# Patient Record
Sex: MALE | Race: BLACK OR AFRICAN AMERICAN | NOT HISPANIC OR LATINO | Employment: PART TIME | URBAN - METROPOLITAN AREA
[De-identification: names, ages, dates, MRNs, and addresses within clinical notes are randomized per-mention and may not be internally consistent; named-entity substitution may affect disease eponyms.]

---

## 2022-05-22 ENCOUNTER — TELEPHONE (OUTPATIENT)
Dept: BEHAVIORAL HEALTH | Facility: CLINIC | Age: 37
End: 2022-05-22

## 2022-05-22 ENCOUNTER — APPOINTMENT (OUTPATIENT)
Dept: RADIOLOGY | Facility: HOSPITAL | Age: 37
End: 2022-05-22
Attending: EMERGENCY MEDICINE

## 2022-05-22 ENCOUNTER — HOSPITAL ENCOUNTER (EMERGENCY)
Facility: HOSPITAL | Age: 37
Discharge: HOME | End: 2022-05-23
Attending: EMERGENCY MEDICINE | Admitting: EMERGENCY MEDICINE

## 2022-05-22 DIAGNOSIS — F15.10 AMPHETAMINE ABUSE (H): Primary | ICD-10-CM

## 2022-05-22 DIAGNOSIS — R44.0 AUDITORY HALLUCINATION: ICD-10-CM

## 2022-05-22 LAB
AMPHETAMINES UR QL SCN: ABNORMAL
ANION GAP SERPL CALCULATED.3IONS-SCNC: 12 MMOL/L (ref 5–18)
ATRIAL RATE - MUSE: 114 BPM
BARBITURATES UR QL: ABNORMAL
BASOPHILS # BLD AUTO: 0.1 10E3/UL (ref 0–0.2)
BASOPHILS NFR BLD AUTO: 1 %
BENZODIAZ UR QL: ABNORMAL
BUN SERPL-MCNC: 10 MG/DL (ref 8–22)
CALCIUM SERPL-MCNC: 9.2 MG/DL (ref 8.5–10.5)
CANNABINOIDS UR QL SCN: ABNORMAL
CHLORIDE BLD-SCNC: 105 MMOL/L (ref 98–107)
CO2 SERPL-SCNC: 24 MMOL/L (ref 22–31)
COCAINE UR QL: ABNORMAL
CREAT SERPL-MCNC: 1.29 MG/DL (ref 0.7–1.3)
CREAT UR-MCNC: >500 MG/DL
D DIMER PPP FEU-MCNC: 0.41 UG/ML FEU (ref 0–0.5)
DIASTOLIC BLOOD PRESSURE - MUSE: NORMAL MMHG
EOSINOPHIL # BLD AUTO: 0.1 10E3/UL (ref 0–0.7)
EOSINOPHIL NFR BLD AUTO: 1 %
ERYTHROCYTE [DISTWIDTH] IN BLOOD BY AUTOMATED COUNT: 15.5 % (ref 10–15)
FLUAV RNA SPEC QL NAA+PROBE: NEGATIVE
FLUBV RNA RESP QL NAA+PROBE: NEGATIVE
GFR SERPL CREATININE-BSD FRML MDRD: 73 ML/MIN/1.73M2
GLUCOSE BLD-MCNC: 84 MG/DL (ref 70–125)
HCT VFR BLD AUTO: 44.2 % (ref 40–53)
HGB BLD-MCNC: 13.8 G/DL (ref 13.3–17.7)
HOLD SPECIMEN: NORMAL
IMM GRANULOCYTES # BLD: 0 10E3/UL
IMM GRANULOCYTES NFR BLD: 0 %
INTERPRETATION ECG - MUSE: NORMAL
LYMPHOCYTES # BLD AUTO: 2.7 10E3/UL (ref 0.8–5.3)
LYMPHOCYTES NFR BLD AUTO: 27 %
MCH RBC QN AUTO: 26.7 PG (ref 26.5–33)
MCHC RBC AUTO-ENTMCNC: 31.2 G/DL (ref 31.5–36.5)
MCV RBC AUTO: 86 FL (ref 78–100)
METHADONE UR QL SCN: ABNORMAL
MONOCYTES # BLD AUTO: 0.8 10E3/UL (ref 0–1.3)
MONOCYTES NFR BLD AUTO: 8 %
NEUTROPHILS # BLD AUTO: 6.4 10E3/UL (ref 1.6–8.3)
NEUTROPHILS NFR BLD AUTO: 63 %
NRBC # BLD AUTO: 0 10E3/UL
NRBC BLD AUTO-RTO: 0 /100
OPIATES UR QL SCN: ABNORMAL
OXYCODONE UR QL: ABNORMAL
P AXIS - MUSE: 71 DEGREES
PCP UR QL SCN: ABNORMAL
PLATELET # BLD AUTO: 378 10E3/UL (ref 150–450)
POTASSIUM BLD-SCNC: 3.6 MMOL/L (ref 3.5–5)
PR INTERVAL - MUSE: 150 MS
QRS DURATION - MUSE: 84 MS
QT - MUSE: 290 MS
QTC - MUSE: 399 MS
R AXIS - MUSE: 40 DEGREES
RBC # BLD AUTO: 5.16 10E6/UL (ref 4.4–5.9)
SARS-COV-2 RNA RESP QL NAA+PROBE: NEGATIVE
SODIUM SERPL-SCNC: 141 MMOL/L (ref 136–145)
SYSTOLIC BLOOD PRESSURE - MUSE: NORMAL MMHG
T AXIS - MUSE: 15 DEGREES
TROPONIN I SERPL-MCNC: <0.01 NG/ML (ref 0–0.29)
VENTRICULAR RATE- MUSE: 114 BPM
WBC # BLD AUTO: 10 10E3/UL (ref 4–11)

## 2022-05-22 PROCEDURE — 250N000011 HC RX IP 250 OP 636: Performed by: EMERGENCY MEDICINE

## 2022-05-22 PROCEDURE — C9803 HOPD COVID-19 SPEC COLLECT: HCPCS

## 2022-05-22 PROCEDURE — 85004 AUTOMATED DIFF WBC COUNT: CPT | Performed by: EMERGENCY MEDICINE

## 2022-05-22 PROCEDURE — 80048 BASIC METABOLIC PNL TOTAL CA: CPT | Performed by: EMERGENCY MEDICINE

## 2022-05-22 PROCEDURE — 99285 EMERGENCY DEPT VISIT HI MDM: CPT | Mod: 25

## 2022-05-22 PROCEDURE — 71046 X-RAY EXAM CHEST 2 VIEWS: CPT

## 2022-05-22 PROCEDURE — 36415 COLL VENOUS BLD VENIPUNCTURE: CPT | Performed by: EMERGENCY MEDICINE

## 2022-05-22 PROCEDURE — 87636 SARSCOV2 & INF A&B AMP PRB: CPT | Performed by: EMERGENCY MEDICINE

## 2022-05-22 PROCEDURE — 80307 DRUG TEST PRSMV CHEM ANLYZR: CPT | Performed by: EMERGENCY MEDICINE

## 2022-05-22 PROCEDURE — 250N000013 HC RX MED GY IP 250 OP 250 PS 637: Performed by: EMERGENCY MEDICINE

## 2022-05-22 PROCEDURE — 90791 PSYCH DIAGNOSTIC EVALUATION: CPT

## 2022-05-22 PROCEDURE — 85379 FIBRIN DEGRADATION QUANT: CPT | Performed by: EMERGENCY MEDICINE

## 2022-05-22 PROCEDURE — 96374 THER/PROPH/DIAG INJ IV PUSH: CPT

## 2022-05-22 PROCEDURE — 93005 ELECTROCARDIOGRAM TRACING: CPT | Performed by: STUDENT IN AN ORGANIZED HEALTH CARE EDUCATION/TRAINING PROGRAM

## 2022-05-22 PROCEDURE — 93005 ELECTROCARDIOGRAM TRACING: CPT | Performed by: EMERGENCY MEDICINE

## 2022-05-22 PROCEDURE — 84484 ASSAY OF TROPONIN QUANT: CPT | Performed by: EMERGENCY MEDICINE

## 2022-05-22 RX ORDER — KETOROLAC TROMETHAMINE 30 MG/ML
15 INJECTION, SOLUTION INTRAMUSCULAR; INTRAVENOUS ONCE
Status: COMPLETED | OUTPATIENT
Start: 2022-05-22 | End: 2022-05-22

## 2022-05-22 RX ORDER — ACETAMINOPHEN 325 MG/1
975 TABLET ORAL ONCE
Status: COMPLETED | OUTPATIENT
Start: 2022-05-22 | End: 2022-05-22

## 2022-05-22 RX ADMIN — ACETAMINOPHEN 975 MG: 325 TABLET ORAL at 10:35

## 2022-05-22 RX ADMIN — KETOROLAC TROMETHAMINE 15 MG: 30 INJECTION, SOLUTION INTRAMUSCULAR at 07:56

## 2022-05-22 ASSESSMENT — ENCOUNTER SYMPTOMS
VOMITING: 0
FEVER: 0
ABDOMINAL PAIN: 0
DYSURIA: 0
FATIGUE: 0
WEAKNESS: 0
COUGH: 1
CHILLS: 0
HEADACHES: 0
SHORTNESS OF BREATH: 0

## 2022-05-22 NOTE — ED PROVIDER NOTES
EMERGENCY DEPARTMENT ENCOUNTER      NAME: Naveen Baez  AGE: 27 year old male  YOB: 1995  MRN: [id number]  EVALUATION DATE & TIME: 5/22/2022  6:15 AM    PCP: No primary care provider on file.    ED PROVIDER: Uma Fischer DO      Chief Complaint   Patient presents with     Chest Pain         FINAL IMPRESSION:  1. Suicidal ideation    2. Auditory hallucination          ED COURSE & MEDICAL DECISION MAKING:    Pertinent Labs & Imaging studies reviewed. (See chart for details)  6:23 AM I met the patient and performed my initial interview and exam.  9:02 AM I rechecked and updated the patient. Patient states that he uses synthetic marijuana, and gets chest pain when he uses that. Requests to talk to mental health provider, denies suicidal ideation, says he's having mental health issues.   9:31 AM I rechecked and updated the patient. Patient reports hearing voices telling him to hurt himself by overdose, and he doesn't feel safe.   12:34 PM I spoke with DEC . Patient continues to endorse thoughts of hurting himself. Patient endorses auditory and visual hallucinations, uses multiple substances to help with depression. Patient could not agree to safety plan for discharge.   2:00 PM  Signed out to Dr. Wilson pending bed placement.      27 year old male presents to the Emergency Department for evaluation of chest pain.  Reports this started a couple days ago after moving some furniture.  He denies any trauma.  He denies any pain with movement of the fracture.  Located to his anterior chest.  Endorses a cough.  No known sick contacts.  Patient is tachycardic and hypertensive on arrival.  Temperature is 37.7  C.  He has reproducible chest pain to the anterior wall.  No overlying rashes.  EKG shows sinus tachycardia with nonspecific changes.  Patient reports history of asthma however otherwise healthy and is not on any daily medications.  Plan to evaluate for ACS, pulmonary embolism, pneumothorax, pneumonia,  coronavirus, influenza among others.  History not very consistent with pericarditis.  No muffled heart sounds.  Lung sounds normal.  He reports a history of tobacco, alcohol and marijuana use but denies any other ilicit drugs.  He is calm on exam, slightly anxious.  Bedside echo with good squeeze, no pericardial effusion.    Labs and imaging are unremarkable.  He remains mildly tachycardic.  He does tell me that he has been using synthetic marijuana.  He endorses hearing and seeing voices, voices telling him to harm himself.  We will obtain a mental health assessment.  Patient endorses these thoughts to the mental health .  Endorses polysubstance use.  Cannot contract for safety.  Patient to be admitted voluntarily.  Pending placement at time of handoff.      PPE worn: surgical mask, gloves      At the conclusion of the encounter I discussed the results of all of the tests and the disposition. The questions were answered. The patient or family acknowledged understanding and was agreeable with the care plan.       MEDICATIONS GIVEN IN THE EMERGENCY:  Medications   ketorolac (TORADOL) injection 15 mg (15 mg Intravenous Given 5/22/22 0756)   acetaminophen (TYLENOL) tablet 975 mg (975 mg Oral Given 5/22/22 1035)       NEW PRESCRIPTIONS STARTED AT TODAY'S ER VISIT  New Prescriptions    No medications on file          =================================================================    HPI    Patient information was obtained from: Patient    Use of : N/A         Naveen Baez is a 27 year old male with a pertinent history of asthma who presents to this ED via private car for evaluation of chest pain.     Patient started having chest pain after moving a few boxes. Patient also endorses cough.  Patient also took 4 baby aspirin this morning at 5 am to no effect. Patient says left side of chest is sharp pain, right side is pressure pain. Patient notes he has history of asthma related chest pain, but this feels  "different. Patient denies any fever, sick contacts. Patient drinks alcohol, and smokes weed. Patient does not take any medications.       REVIEW OF SYSTEMS   Review of Systems   Constitutional: Negative for chills, fatigue and fever.   Respiratory: Positive for cough. Negative for shortness of breath.    Cardiovascular: Positive for chest pain.   Gastrointestinal: Negative for abdominal pain and vomiting.   Genitourinary: Negative for dysuria.   Skin: Negative.    Neurological: Negative for syncope, weakness and headaches.   Psychiatric/Behavioral: Positive for suicidal ideas.   All other systems reviewed and are negative.       PAST MEDICAL HISTORY:  No past medical history on file.    PAST SURGICAL HISTORY:  No past surgical history on file.        CURRENT MEDICATIONS:    No current outpatient medications on file.       ALLERGIES:  No Known Allergies    FAMILY HISTORY:  No family history on file.    SOCIAL HISTORY:        VITALS:  BP (!) 143/83   Pulse 102   Temp 98.1  F (36.7  C) (Oral)   Resp 19   Ht 1.854 m (6' 1\")   Wt 124.7 kg (275 lb)   SpO2 97%   BMI 36.28 kg/m      PHYSICAL EXAM    Physical Exam  Constitutional:       General: He is not in acute distress.  HENT:      Head: Normocephalic and atraumatic.      Mouth/Throat:      Pharynx: Oropharynx is clear.   Eyes:      Pupils: Pupils are equal, round, and reactive to light.   Cardiovascular:      Rate and Rhythm: Regular rhythm. Tachycardia present.      Pulses: Normal pulses.      Heart sounds: Normal heart sounds.   Pulmonary:      Effort: Pulmonary effort is normal.      Breath sounds: Normal breath sounds.   Chest:      Chest wall: Tenderness present. No deformity or crepitus.   Abdominal:      General: Abdomen is flat. Bowel sounds are normal.      Palpations: Abdomen is soft.      Tenderness: There is no abdominal tenderness.   Musculoskeletal:         General: Normal range of motion.   Skin:     General: Skin is warm and dry.      Capillary " Refill: Capillary refill takes less than 2 seconds.   Neurological:      General: No focal deficit present.      Mental Status: He is alert and oriented to person, place, and time.   Psychiatric:         Mood and Affect: Mood is anxious.         Thought Content: Thought content includes suicidal ideation.             LAB:  All pertinent labs reviewed and interpreted.  Labs Ordered and Resulted from Time of ED Arrival to Time of ED Departure   CBC WITH PLATELETS AND DIFFERENTIAL - Abnormal       Result Value    WBC Count 10.0      RBC Count 5.16      Hemoglobin 13.8      Hematocrit 44.2      MCV 86      MCH 26.7      MCHC 31.2 (*)     RDW 15.5 (*)     Platelet Count 378      % Neutrophils 63      % Lymphocytes 27      % Monocytes 8      % Eosinophils 1      % Basophils 1      % Immature Granulocytes 0      NRBCs per 100 WBC 0      Absolute Neutrophils 6.4      Absolute Lymphocytes 2.7      Absolute Monocytes 0.8      Absolute Eosinophils 0.1      Absolute Basophils 0.1      Absolute Immature Granulocytes 0.0      Absolute NRBCs 0.0     DRUGS OF ABUSE 1+ PANEL, URINE ([facility] ONLY) - Abnormal    Amphetamines Urine Screen Positive (*)     Benzodiazepines Urine Screen Positive (*)     Opiates Urine Screen Negative      PCP Urine Screen Negative      Cannabinoids Urine Screen Negative      Barbiturates Urine Screen Negative      Cocaine Urine Screen Negative      Methadone Urine Screen Negative      Oxycodone Urine Screen Negative      Creatinine Urine mg/dL >500     D DIMER QUANTITATIVE - Normal    D-Dimer Quantitative 0.41     BASIC METABOLIC PANEL - Normal    Sodium 141      Potassium 3.6      Chloride 105      Carbon Dioxide (CO2) 24      Anion Gap 12      Urea Nitrogen 10      Creatinine 1.29      Calcium 9.2      Glucose 84      GFR Estimate 73     TROPONIN I - Normal    Troponin I <0.01     INFLUENZA A/B & SARS-COV2 PCR MULTIPLEX - Normal    Influenza A PCR Negative      Influenza B PCR Negative      SARS CoV2 PCR  Negative         RADIOLOGY:  Reviewed all pertinent imaging. Please see official radiology report.  XR Chest 2 Views   Final Result   IMPRESSION: Negative chest.          EKG:    Performed at: 6:09 AM    Impression: sinus tachycardia, nonspecific T wave abnormality    Rate: 114 bpm  Rhythm: sinus tachycardia  Axis: 71 40 15  WI Interval: 150 ms  QRS Interval: 84 ms  QTc Interval: 399 ms  ST Changes: nonspecific T wave abnormality  Comparison: none    I have independently reviewed and interpreted the EKG(s) documented above.        PROCEDURE:    Emergency Department Limited Bedside Screening Cardiac Ultrasound   INDICATIONS: chest pain   PROCEDURE PROVIDER: Dr. Ama Fischer    WINDOW AND FINDINGS:    SUB-XYPHOID     :      Cardiac activity: Normal  Pericardial effusion: No clinically significant pericardial effusion  Signs of Tamponade physiology: Absent   PARASTERNAL    :  Cardiac activity: Normal  Pericardial effusion: No clinically significant pericardial effusion  Signs of Tamponade physiology: Absent     IMAGES SAVED AND STORED FOR ARCHIVE AND REVIEW: No         I, Nabor Long, am serving as a scribe to document services personally performed by Dr. Uma Fischer based on my observation and the provider's statements to me. Uma MATTHEWS, DO attest that Nabor Long is acting in a scribe capacity, has observed my performance of the services and has documented them in accordance with my direction.    Uma Fischer DO  Emergency Medicine  [facility] EMERGENCY DEPARTMENT  [address] [id number] [phone]  Dept: [phone]     Uma Fischer DO  05/22/22 6164

## 2022-05-22 NOTE — TELEPHONE ENCOUNTER
S:  Jenny, DEC called @ 12:45pm with 37y/M in [facility] ED with SI for IP MH.    B:  Pt presents to ED reporting SI with a plan to overdose.   Pt has a significant drug hx and depression hx.  Pt has never been IP for MH before, and takes no psych medications.   Pt is reports AH of a voice telling him to hurt self.  Additionally VH of shadows and hollograms anastacia people who have passed.   Pt has been very calm and cooperative in ED and wants IP to get help.  No medical Concerns and Ind w/all ADL's.       A:  Vol    R:  Patient cleared and ready for behavioral bed placement: Yes

## 2022-05-22 NOTE — ED NOTES
SaraNaveen Baez  May 22, 2022  SAFE Note    Critical Safety Issues: reported command hallucinations instructing him to harm himself.       Current Suicidal Ideation/Self-Injurious Concerns/Methods: Ingestion OD on drugs      Current or Historical Inappropriate Sexual Behavior: No      Current or Historical Aggression/Homicidal Ideation: None - N/A      Triggers: None reported    Guardianship Status: [facility] Guardian: is his own guardian.. Guardianship paperwork is not required.    This patient is a child/adolescent: No    This patient has additional special visitor precautions: No    Updated care team: Yes:     For additional details see full [facility] assessment.       Nohelia Marques, LP
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
## 2022-05-22 NOTE — ED TRIAGE NOTES
He reports CP for 3 days. He states he was moving heavy furniture. ECG completed in lobby. He states he too 4 baby aspirin around 5am. Pain an 8.     Triage Assessment     Row Name 05/22/22 0613       Triage Assessment (Adult)    Airway WDL WDL       Respiratory WDL    Respiratory WDL WDL       Skin Circulation/Temperature WDL    Skin Circulation/Temperature WDL WDL       Cardiac WDL    Cardiac WDL X  cp       Peripheral/Neurovascular WDL    Peripheral Neurovascular WDL WDL       Cognitive/Neuro/Behavioral WDL    Cognitive/Neuro/Behavioral WDL WDL

## 2022-05-22 NOTE — TELEPHONE ENCOUNTER
5/22/2022 Adult Bed Availability    Updated Bed Search @5pm:    [facility] @ cap  [facility] @ cap  [facility] @ cap  [facility] @ cap  [facility] @ cap  [facility] @ cap  [facility] @ cap  [facility] @ cap  [facility] @ cap  [facility] @ cap  [facility] @ cap  [facility] @ cap  [facility] @ cap  [facility] @ cap  [facility] @ cap  [facility] @ cap  [facility] @ cap  [facility] @ cap  [facility] posting one avail beds. Voluntary pts only. No aggression/violence current or hx of. [phone].  [facility] posting one avail beds. [phone]. Per call to Janna, one low acuity bed avail.  [facility] posting six avail beds, low acuity only. [phone].  [facility] @ cap  [facility] @ cap  [facility] posting one avail bed. Voluntary pts only, no aggression/violence    [facility] posting four avail beds. [phone].  [facility] @ cap        5:20pm Pt remains on work list until appropriate placement is available

## 2022-05-22 NOTE — ED NOTES
[facility] ED Mental Health Handoff Note:     Assuming care from: Dr Ama Shin    Brief HPI: 37 year old male signed out to me by the above provider. See initial ED Provider note for full details of the presentation.   In brief, patient initially presented with chest pain but then became suicidal.  He likely has some illicit drugs on board    Home meds reviewed and ordered/administered: Yes  Medically stable for inpatient mental health admission: Yes.  Evaluated by mental health: No. Patient is clinically sober and awaiting evaluation for disposition.  Safety concerns: At the time I received sign out, there were no safety concerns.    Hold Status:  Active Orders   N/A            Labs/Imaging:   Results for orders placed or performed during the hospital encounter of 05/22/22 (from the past 24 hour(s))   D dimer quantitative     Status: Normal    Collection Time: 05/22/22  7:03 AM   Result Value Ref Range    D-Dimer Quantitative 0.41 0.00 - 0.50 ug/mL FEU    Narrative    This D-dimer assay is intended for use in conjunction with a clinical pretest probability assessment model to exclude pulmonary embolism (PE) and deep venous thrombosis (DVT) in outpatients suspected of PE or DVT. The cut-off value is 0.50 ug/mL FEU.   Symptomatic; Auto-generated order Influenza A/B & SARS-CoV2 (COVID-19) Virus PCR Multiplex Nasopharyngeal     Status: Normal    Collection Time: 05/22/22  7:03 AM    Specimen: Nasopharyngeal; Swab   Result Value Ref Range    Influenza A PCR Negative Negative    Influenza B PCR Negative Negative    SARS CoV2 PCR Negative Negative    Narrative    Testing was performed using the [employer] SARS-CoV-2 & Influenza A/B Assay on the [employer] Melanie System. This test should be ordered for the detection of SARS-CoV-2 and influenza viruses in individuals who meet clinical and/or epidemiological criteria. Test performance is unknown in asymptomatic patients. This test is for in vitro diagnostic use under the FDA  EUA for laboratories certified under CLIA to perform moderate and/or high complexity testing. This test has not been FDA cleared or approved. A negative result does not rule out the presence of PCR inhibitors in the specimen or target RNA in concentration below the limit of detection for the assay. If only one viral target is positive but coinfection with multiple targets is suspected, the sample should be re-tested with another FDA cleared, approved or authorized test, if coinfection would change clinical management. [facility] are certified under the Clinical Laboratory Improvement Amendments of 1988 (CLIA-88) as  qualified to perform moderate and/or high complexity laboratory testing.   CBC with platelets differential     Status: Abnormal    Collection Time: 05/22/22  7:44 AM    Narrative    The following orders were created for panel order CBC with platelets differential.  Procedure                               Abnormality         Status                     ---------                               -----------         ------                     CBC with platelets and d...[[id number]]  Abnormal            Final result                 Please view results for these tests on the individual orders.   CBC with platelets and differential     Status: Abnormal    Collection Time: 05/22/22  7:44 AM   Result Value Ref Range    WBC Count 10.0 4.0 - 11.0 10e3/uL    RBC Count 5.16 4.40 - 5.90 10e6/uL    Hemoglobin 13.8 13.3 - 17.7 g/dL    Hematocrit 44.2 40.0 - 53.0 %    MCV 86 78 - 100 fL    MCH 26.7 26.5 - 33.0 pg    MCHC 31.2 (L) 31.5 - 36.5 g/dL    RDW 15.5 (H) 10.0 - 15.0 %    Platelet Count 378 150 - 450 10e3/uL    % Neutrophils 63 %    % Lymphocytes 27 %    % Monocytes 8 %    % Eosinophils 1 %    % Basophils 1 %    % Immature Granulocytes 0 %    NRBCs per 100 WBC 0 <1 /100    Absolute Neutrophils 6.4 1.6 - 8.3 10e3/uL    Absolute Lymphocytes 2.7 0.8 - 5.3 10e3/uL    Absolute Monocytes 0.8 0.0 - 1.3  10e3/uL    Absolute Eosinophils 0.1 0.0 - 0.7 10e3/uL    Absolute Basophils 0.1 0.0 - 0.2 10e3/uL    Absolute Immature Granulocytes 0.0 <=0.4 10e3/uL    Absolute NRBCs 0.0 10e3/uL   Basic metabolic panel     Status: Normal    Collection Time: 05/22/22  7:52 AM   Result Value Ref Range    Sodium 141 136 - 145 mmol/L    Potassium 3.6 3.5 - 5.0 mmol/L    Chloride 105 98 - 107 mmol/L    Carbon Dioxide (CO2) 24 22 - 31 mmol/L    Anion Gap 12 5 - 18 mmol/L    Urea Nitrogen 10 8 - 22 mg/dL    Creatinine 1.29 0.70 - 1.30 mg/dL    Calcium 9.2 8.5 - 10.5 mg/dL    Glucose 84 70 - 125 mg/dL    GFR Estimate 73 >60 mL/min/1.73m2   Troponin I     Status: Normal    Collection Time: 05/22/22  7:52 AM   Result Value Ref Range    Troponin I <0.01 0.00 - 0.29 ng/mL   [place] Draw     Status: None    Collection Time: 05/22/22  7:52 AM    Narrative    The following orders were created for panel order [place] Draw.  Procedure                               Abnormality         Status                     ---------                               -----------         ------                     Extra Blue Top Tube[[id number]]                              Final result                 Please view results for these tests on the individual orders.   Extra Blue Top Tube     Status: None    Collection Time: 05/22/22  7:52 AM   Result Value Ref Range    Hold Specimen JIC    XR Chest 2 Views     Status: None    Collection Time: 05/22/22  8:24 AM    Narrative    EXAM: XR CHEST 2 VW  LOCATION: [facility]  DATE/TIME: 5/22/2022 8:23 AM    INDICATION: Chest pain  COMPARISON: None.      Impression    IMPRESSION: Negative chest.   Urine Drugs of Abuse Screen Panel 1+ - Drug Screen plus Methadone     Status: Abnormal    Collection Time: 05/22/22 12:50 PM    Narrative    The following orders were created for panel order Urine Drugs of Abuse Screen Panel 1+ - Drug Screen plus Methadone.  Procedure                               Abnormality          Status                     ---------                               -----------         ------                     Drugs of Abuse 1+ Panel,...[[id number]]  Abnormal            Final result                 Please view results for these tests on the individual orders.   Drugs of Abuse 1+ Panel, Urine ([facility] Only)     Status: Abnormal    Collection Time: 05/22/22 12:50 PM   Result Value Ref Range    Amphetamines Urine Screen Positive (A) Screen Negative    Benzodiazepines Urine Screen Positive (A) Screen Negative    Opiates Urine Screen Negative Screen Negative    PCP Urine Screen Negative Screen Negative    Cannabinoids Urine Screen Negative Screen Negative    Barbiturates Urine Screen Negative Screen Negative    Cocaine Urine Screen Negative Screen Negative    Methadone Urine Screen Negative Screen Negative    Oxycodone Urine Screen Negative Screen Negative    Creatinine Urine mg/dL >500 mg/dL    Narrative    Drug                           Screening Threshold    Amphetamines                    1000 ng/mL  Benzodiazepine                   200 ng/mL  Opiates                          300 ng/mL  Phencyclidine                     25 ng/mL  THC Metabolite                    50 ng/mL  Barbiturates                     200 ng/mL  Cocaine Metabolite               150 ng/mL  Methadone                        300 ng/mL  Oxycodone                        100 ng/mL    Screening results are to be used only for medical purposes.  Unconfirmed screening results are not to be used for non-  medical purposes.         ED Meds:  Medications   ketorolac (TORADOL) injection 15 mg (15 mg Intravenous Given 5/22/22 0756)   acetaminophen (TYLENOL) tablet 975 mg (975 mg Oral Given 5/22/22 1035)     XR Chest 2 Views   Final Result   IMPRESSION: Negative chest.          ED Course:  ED Course as of 05/22/22 2153   Sun May 22, 2022   9347 Patient is a 37-year-old male who initially presented with chest pain but complains of feeling suicidal.  He  is currently voluntary but is holdable.  He is waiting for DEC assessment and needs psychiatric admission.   2152 Patient is signed out to Dr. Donald at change of shift pending DEC assessment and disposition.       There were no significant events during my shift.    Patient was signed out to the oncoming provider, Dr. Poncho Donald at shift change    Impression:    ICD-10-CM    1. Suicidal ideation  R45.851    2. Auditory hallucination  R44.0        Plan:    1. Awaiting mental health evaluation/recommendations.    Brandi Wilson MD  [facility] EMERGENCY DEPARTMENT  [address] [place] [id number]  [phone]                   Brandi Wilson MD  05/22/22 8416

## 2022-05-22 NOTE — ED NOTES
"Pt stated to writer \"I do not feel safe, these voices in my head are telling me to kill myself and I am afraid I'm going to right O D on these drugs and I don't want to do that again\". Dr Fischer notified.  "

## 2022-05-23 ENCOUNTER — TELEPHONE (OUTPATIENT)
Dept: BEHAVIORAL HEALTH | Facility: CLINIC | Age: 37
End: 2022-05-23

## 2022-05-23 VITALS
HEART RATE: 87 BPM | WEIGHT: 275 LBS | SYSTOLIC BLOOD PRESSURE: 148 MMHG | RESPIRATION RATE: 16 BRPM | TEMPERATURE: 98.4 F | HEIGHT: 73 IN | OXYGEN SATURATION: 96 % | BODY MASS INDEX: 36.45 KG/M2 | DIASTOLIC BLOOD PRESSURE: 80 MMHG

## 2022-05-23 NOTE — ED NOTES
[facility] ED Mental Health Handoff Note:     Assuming care from: Dr Brandi Wilson    Brief HPI: 37 year old male signed out to me by the above provider. See initial ED Provider note for full details of the presentation.   In brief, patient presented with chest pain after helping a friend move.  However on arrival and then reported being suicidal.    I, Jesus Lopez, am serving as a scribe to document services personally performed by Poncho Donald MD, based on my observations and the provider's statements to me.  I, Poncho Donald MD, attest that Jesus Lopez is acting in a scribe capacity, has observed my performance of the services and has documented them in accordance with my direction.     Home meds reviewed and ordered/administered: No  Medically stable for inpatient mental health admission: Yes.  Evaluated by mental health: No  Safety concerns: At the time I received sign out, none.    Hold Status:  Active Orders   N/A           Labs/Imaging:   Results for orders placed or performed during the hospital encounter of 05/22/22 (from the past 24 hour(s))   D dimer quantitative     Status: Normal    Collection Time: 05/22/22  7:03 AM   Result Value Ref Range    D-Dimer Quantitative 0.41 0.00 - 0.50 ug/mL FEU    Narrative    This D-dimer assay is intended for use in conjunction with a clinical pretest probability assessment model to exclude pulmonary embolism (PE) and deep venous thrombosis (DVT) in outpatients suspected of PE or DVT. The cut-off value is 0.50 ug/mL FEU.   Symptomatic; Auto-generated order Influenza A/B & SARS-CoV2 (COVID-19) Virus PCR Multiplex Nasopharyngeal     Status: Normal    Collection Time: 05/22/22  7:03 AM    Specimen: Nasopharyngeal; Swab   Result Value Ref Range    Influenza A PCR Negative Negative    Influenza B PCR Negative Negative    SARS CoV2 PCR Negative Negative    Narrative    Testing was performed using the [employer] SARS-CoV-2 & Influenza A/B Assay on the [employer] Melanie System.  This test should be ordered for the detection of SARS-CoV-2 and influenza viruses in individuals who meet clinical and/or epidemiological criteria. Test performance is unknown in asymptomatic patients. This test is for in vitro diagnostic use under the FDA EUA for laboratories certified under CLIA to perform moderate and/or high complexity testing. This test has not been FDA cleared or approved. A negative result does not rule out the presence of PCR inhibitors in the specimen or target RNA in concentration below the limit of detection for the assay. If only one viral target is positive but coinfection with multiple targets is suspected, the sample should be re-tested with another FDA cleared, approved or authorized test, if coinfection would change clinical management. [facility] are certified under the Clinical Laboratory Improvement Amendments of 1988 (CLIA-88) as  qualified to perform moderate and/or high complexity laboratory testing.   CBC with platelets differential     Status: Abnormal    Collection Time: 05/22/22  7:44 AM    Narrative    The following orders were created for panel order CBC with platelets differential.  Procedure                               Abnormality         Status                     ---------                               -----------         ------                     CBC with platelets and d...[[id number]]  Abnormal            Final result                 Please view results for these tests on the individual orders.   CBC with platelets and differential     Status: Abnormal    Collection Time: 05/22/22  7:44 AM   Result Value Ref Range    WBC Count 10.0 4.0 - 11.0 10e3/uL    RBC Count 5.16 4.40 - 5.90 10e6/uL    Hemoglobin 13.8 13.3 - 17.7 g/dL    Hematocrit 44.2 40.0 - 53.0 %    MCV 86 78 - 100 fL    MCH 26.7 26.5 - 33.0 pg    MCHC 31.2 (L) 31.5 - 36.5 g/dL    RDW 15.5 (H) 10.0 - 15.0 %    Platelet Count 378 150 - 450 10e3/uL    % Neutrophils 63 %    % Lymphocytes  27 %    % Monocytes 8 %    % Eosinophils 1 %    % Basophils 1 %    % Immature Granulocytes 0 %    NRBCs per 100 WBC 0 <1 /100    Absolute Neutrophils 6.4 1.6 - 8.3 10e3/uL    Absolute Lymphocytes 2.7 0.8 - 5.3 10e3/uL    Absolute Monocytes 0.8 0.0 - 1.3 10e3/uL    Absolute Eosinophils 0.1 0.0 - 0.7 10e3/uL    Absolute Basophils 0.1 0.0 - 0.2 10e3/uL    Absolute Immature Granulocytes 0.0 <=0.4 10e3/uL    Absolute NRBCs 0.0 10e3/uL   Basic metabolic panel     Status: Normal    Collection Time: 05/22/22  7:52 AM   Result Value Ref Range    Sodium 141 136 - 145 mmol/L    Potassium 3.6 3.5 - 5.0 mmol/L    Chloride 105 98 - 107 mmol/L    Carbon Dioxide (CO2) 24 22 - 31 mmol/L    Anion Gap 12 5 - 18 mmol/L    Urea Nitrogen 10 8 - 22 mg/dL    Creatinine 1.29 0.70 - 1.30 mg/dL    Calcium 9.2 8.5 - 10.5 mg/dL    Glucose 84 70 - 125 mg/dL    GFR Estimate 73 >60 mL/min/1.73m2   Troponin I     Status: Normal    Collection Time: 05/22/22  7:52 AM   Result Value Ref Range    Troponin I <0.01 0.00 - 0.29 ng/mL   [place] Draw     Status: None    Collection Time: 05/22/22  7:52 AM    Narrative    The following orders were created for panel order [place] Draw.  Procedure                               Abnormality         Status                     ---------                               -----------         ------                     Extra Blue Top Tube[[id number]]                              Final result                 Please view results for these tests on the individual orders.   Extra Blue Top Tube     Status: None    Collection Time: 05/22/22  7:52 AM   Result Value Ref Range    Hold Specimen JIC    XR Chest 2 Views     Status: None    Collection Time: 05/22/22  8:24 AM    Narrative    EXAM: XR CHEST 2 VW  LOCATION: [facility]  DATE/TIME: 5/22/2022 8:23 AM    INDICATION: Chest pain  COMPARISON: None.      Impression    IMPRESSION: Negative chest.   Urine Drugs of Abuse Screen Panel 1+ - Drug Screen plus  Methadone     Status: Abnormal    Collection Time: 05/22/22 12:50 PM    Narrative    The following orders were created for panel order Urine Drugs of Abuse Screen Panel 1+ - Drug Screen plus Methadone.  Procedure                               Abnormality         Status                     ---------                               -----------         ------                     Drugs of Abuse 1+ Panel,...[[id number]]  Abnormal            Final result                 Please view results for these tests on the individual orders.   Drugs of Abuse 1+ Panel, Urine ([facility])     Status: Abnormal    Collection Time: 05/22/22 12:50 PM   Result Value Ref Range    Amphetamines Urine Screen Positive (A) Screen Negative    Benzodiazepines Urine Screen Positive (A) Screen Negative    Opiates Urine Screen Negative Screen Negative    PCP Urine Screen Negative Screen Negative    Cannabinoids Urine Screen Negative Screen Negative    Barbiturates Urine Screen Negative Screen Negative    Cocaine Urine Screen Negative Screen Negative    Methadone Urine Screen Negative Screen Negative    Oxycodone Urine Screen Negative Screen Negative    Creatinine Urine mg/dL >500 mg/dL    Narrative    Drug                           Screening Threshold    Amphetamines                    1000 ng/mL  Benzodiazepine                   200 ng/mL  Opiates                          300 ng/mL  Phencyclidine                     25 ng/mL  THC Metabolite                    50 ng/mL  Barbiturates                     200 ng/mL  Cocaine Metabolite               150 ng/mL  Methadone                        300 ng/mL  Oxycodone                        100 ng/mL    Screening results are to be used only for medical purposes.  Unconfirmed screening results are not to be used for non-  medical purposes.         ED Meds:  Medications   ketorolac (TORADOL) injection 15 mg (15 mg Intravenous Given 5/22/22 0756)   acetaminophen (TYLENOL) tablet 975 mg (975 mg Oral Given 5/22/22  1035)     XR Chest 2 Views   Final Result   IMPRESSION: Negative chest.          ED Course:  ED Course as of 05/22/22 2154   Sun May 22, 2022   1437 Patient is a 37-year-old male who initially presented with chest pain but complains of feeling suicidal.  He is currently voluntary but is holdable.  He is waiting for DEC assessment and needs psychiatric admission.   2152 Patient is signed out to Dr. Donald at change of shift pending DEC assessment and disposition.       There were no significant events during my shift.    Patient was signed out to the oncoming provider, Dr. Dixon at shift change    Impression:    ICD-10-CM    1. Suicidal ideation  R45.851    2. Auditory hallucination  R44.0        Plan:    1. Awaiting DEC evaluation and determination for placement    Poncho Donald MD   [facility] EMERGENCY DEPARTMENT  [address] [place] [id number]  [phone]                   Poncho Donald MD  05/23/22 0427

## 2022-05-23 NOTE — TELEPHONE ENCOUNTER
R:  No appropriate beds available within [facility] system. Bed search update (metro) @ 00:16:    [facility]: @ cap per website  [facility]:@ cap per website  [facility]: @ cap per website  [facility]: @ cap per website  [facility]: @ cap per website  [facility]: @ cap per website  [facility]: @ cap per website    Pt remains on work list until appropriate placement is available

## 2022-05-23 NOTE — ED NOTES
Triage & Transition Services, Extended Care     Naveen Baez  May 23, 2022    Naveen is followed related to Long wait time for admission: 29+ hours awaiting IP  Bed. Please see initial DEC Crisis Assessment completed for complete assessment information. Medical record is reviewed.     There are not significant status changes. Full DEC Reassessment is not recommended at this time. Extended Care continues to be available for review of changes to initial crisis state resulting in this encounter.     Recommend  to continue care coordination with ED staff as needed.      Extended Care will follow and meet with patient/family/care team as able or requested.     LARS Carreon  [facility], Extended Care   [phone]

## 2022-05-23 NOTE — ED PROVIDER NOTES
[facility] ED Mental Health Handoff Note:       Brief HPI:  This is a 37 year old male signed out to me by Dr. Tamiko Dixon.  See initial ED Provider note for full details of the presentation.     Home meds reviewed and ordered/administered: Yes    Medically stable for inpatient mental health admission: Yes.    Evaluated by mental health: Yes. The recommendation is for inpatient mental health treatment. Bed search in process    Safety concerns: At the time I received sign out, there were no safety concerns.    Hold Status:  Active Orders   N/A           Exam:   Patient Vitals for the past 24 hrs:   BP Temp Temp src Pulse Resp SpO2   05/23/22 0422 (!) 151/67 97.7  F (36.5  C) Oral 72 16 96 %   05/22/22 1930 131/67 98.1  F (36.7  C) Oral 84 -- 97 %   05/22/22 1546 (!) 142/80 97.3  F (36.3  C) Oral 97 18 96 %   05/22/22 1409 (!) 143/83 -- -- 102 19 97 %   05/22/22 0830 (!) 162/107 -- -- 109 -- 96 %   05/22/22 0800 (!) 161/103 -- -- 109 -- 96 %   05/22/22 0745 (!) 166/103 -- -- 113 -- --   05/22/22 0730 (!) 154/99 -- -- 114 -- 97 %           ED Course:    Medications   ketorolac (TORADOL) injection 15 mg (15 mg Intravenous Given 5/22/22 0756)   acetaminophen (TYLENOL) tablet 975 mg (975 mg Oral Given 5/22/22 1035)       ED Course as of 05/23/22 0725   Sun May 22, 2022   1437 Patient is a 37-year-old male who initially presented with chest pain but complains of feeling suicidal.  He is currently voluntary but is holdable.  He is waiting for DEC assessment and needs psychiatric admission.   2152 Patient is signed out to Dr. Donald at change of shift pending DEC assessment and disposition.       There were no significant events during my shift.          Impression:    ICD-10-CM    1. Suicidal ideation  R45.851    2. Auditory hallucination  R44.0        Plan:    1. Awaiting inpatient mental health admission/transfer.      RESULTS:   Results for orders placed or performed during the hospital encounter of 05/22/22  (from the past 24 hour(s))   CBC with platelets differential     Status: Abnormal    Collection Time: 05/22/22  7:44 AM    Narrative    The following orders were created for panel order CBC with platelets differential.  Procedure                               Abnormality         Status                     ---------                               -----------         ------                     CBC with platelets and d...[[id number]]  Abnormal            Final result                 Please view results for these tests on the individual orders.   CBC with platelets and differential     Status: Abnormal    Collection Time: 05/22/22  7:44 AM   Result Value Ref Range    WBC Count 10.0 4.0 - 11.0 10e3/uL    RBC Count 5.16 4.40 - 5.90 10e6/uL    Hemoglobin 13.8 13.3 - 17.7 g/dL    Hematocrit 44.2 40.0 - 53.0 %    MCV 86 78 - 100 fL    MCH 26.7 26.5 - 33.0 pg    MCHC 31.2 (L) 31.5 - 36.5 g/dL    RDW 15.5 (H) 10.0 - 15.0 %    Platelet Count 378 150 - 450 10e3/uL    % Neutrophils 63 %    % Lymphocytes 27 %    % Monocytes 8 %    % Eosinophils 1 %    % Basophils 1 %    % Immature Granulocytes 0 %    NRBCs per 100 WBC 0 <1 /100    Absolute Neutrophils 6.4 1.6 - 8.3 10e3/uL    Absolute Lymphocytes 2.7 0.8 - 5.3 10e3/uL    Absolute Monocytes 0.8 0.0 - 1.3 10e3/uL    Absolute Eosinophils 0.1 0.0 - 0.7 10e3/uL    Absolute Basophils 0.1 0.0 - 0.2 10e3/uL    Absolute Immature Granulocytes 0.0 <=0.4 10e3/uL    Absolute NRBCs 0.0 10e3/uL   Basic metabolic panel     Status: Normal    Collection Time: 05/22/22  7:52 AM   Result Value Ref Range    Sodium 141 136 - 145 mmol/L    Potassium 3.6 3.5 - 5.0 mmol/L    Chloride 105 98 - 107 mmol/L    Carbon Dioxide (CO2) 24 22 - 31 mmol/L    Anion Gap 12 5 - 18 mmol/L    Urea Nitrogen 10 8 - 22 mg/dL    Creatinine 1.29 0.70 - 1.30 mg/dL    Calcium 9.2 8.5 - 10.5 mg/dL    Glucose 84 70 - 125 mg/dL    GFR Estimate 73 >60 mL/min/1.73m2   Troponin I     Status: Normal    Collection Time: 05/22/22  7:52 AM    Result Value Ref Range    Troponin I <0.01 0.00 - 0.29 ng/mL   [place] Draw     Status: None    Collection Time: 05/22/22  7:52 AM    Narrative    The following orders were created for panel order [place] Draw.  Procedure                               Abnormality         Status                     ---------                               -----------         ------                     Extra Blue Top Tube[[id number]]                              Final result                 Please view results for these tests on the individual orders.   Extra Blue Top Tube     Status: None    Collection Time: 05/22/22  7:52 AM   Result Value Ref Range    Hold Specimen JIC    XR Chest 2 Views     Status: None    Collection Time: 05/22/22  8:24 AM    Narrative    EXAM: XR CHEST 2 VW  LOCATION: [facility]  DATE/TIME: 5/22/2022 8:23 AM    INDICATION: Chest pain  COMPARISON: None.      Impression    IMPRESSION: Negative chest.   Urine Drugs of Abuse Screen Panel 1+ - Drug Screen plus Methadone     Status: Abnormal    Collection Time: 05/22/22 12:50 PM    Narrative    The following orders were created for panel order Urine Drugs of Abuse Screen Panel 1+ - Drug Screen plus Methadone.  Procedure                               Abnormality         Status                     ---------                               -----------         ------                     Drugs of Abuse 1+ Panel,...[[id number]]  Abnormal            Final result                 Please view results for these tests on the individual orders.   Drugs of Abuse 1+ Panel, Urine ([facility] Only)     Status: Abnormal    Collection Time: 05/22/22 12:50 PM   Result Value Ref Range    Amphetamines Urine Screen Positive (A) Screen Negative    Benzodiazepines Urine Screen Positive (A) Screen Negative    Opiates Urine Screen Negative Screen Negative    PCP Urine Screen Negative Screen Negative    Cannabinoids Urine Screen Negative Screen Negative    Barbiturates Urine  Screen Negative Screen Negative    Cocaine Urine Screen Negative Screen Negative    Methadone Urine Screen Negative Screen Negative    Oxycodone Urine Screen Negative Screen Negative    Creatinine Urine mg/dL >500 mg/dL    Narrative    Drug                           Screening Threshold    Amphetamines                    1000 ng/mL  Benzodiazepine                   200 ng/mL  Opiates                          300 ng/mL  Phencyclidine                     25 ng/mL  THC Metabolite                    50 ng/mL  Barbiturates                     200 ng/mL  Cocaine Metabolite               150 ng/mL  Methadone                        300 ng/mL  Oxycodone                        100 ng/mL    Screening results are to be used only for medical purposes.  Unconfirmed screening results are not to be used for non-  medical purposes.         Arnoldo Fischer MD  [facility] Emergency Department                     Arnoldo Fischer,   05/23/22 0737

## 2022-05-23 NOTE — ED NOTES
3:10 PM - Patient signed out to me by Dr. Fischer at routine shift change. 37 year old male with hallucinations & SI/HI, voluntary but holdable. Plan is boarding while awaiting inpatient psych placement. Please see prior notes for details.    7:33 PM - Patient stating he wants to go home. States he used synthetic drugs (UDS was positive for amephteamines). States these have worn off now and denies any hallucinations, SI, or HI. States he was concerned that he would start using again; no plan to specifically hurt himself. Agreeable to talk again with DEC at this time.    10:09 PM - Patient re-evaluated by DEC who feels he does not require inpatient psych placement at this time and is ok for outpatient management. 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 Outpatient resources given. Patient calm and cooperative with me; wants to go home at this time. Suspect initial amphetamine intoxication driving initial presentation and has now worn off; ok for outpatient management. Patient able to tolerate PO and walk without difficulty. Return precautions and need for clinic follow up discussed and understood. No further questions at the time of discharge.      IMPRESSION:    ICD-10-CM    1. Suicidal ideation  R45.851    2. Auditory hallucination  R44.0            Marcio Castelan MD  05/23/22  Emergency Medicine  [facility] EMERGENCY DEPARTMENT  [address] [place] [id number]  [phone]  Dept: [phone]     Marcio Castelan MD  05/23/22 6308

## 2022-05-23 NOTE — TELEPHONE ENCOUNTER
R:    Inpatient Bed Call Log 05/23/2022 Morning done at 8:05a    Adults:    [facility] is posting 0 beds.     [facility] is posting 0 beds.    [facility] is posting 0 beds.    [facility] is posting 0 beds.    [facility] is posting 0 beds.    [facility] is posting 0 beds.    [facility] is posting 0 beds.    [facility] is posting 0 beds.  8:18a [facility]. The pt remains on the waitlist. Intake continues to identify appropriate bed placement.

## 2022-05-23 NOTE — ED NOTES
Pt was asking on when he is going to be admitted. Talked to pt and informed that the previous RN informed writer that he has to be seen by . Pt complain that he is in a room where it is small and make him unsettled. Informed pt if he needs something to help him and pt said no. Encourage pt to get some rest and call if he needs help.

## 2022-05-24 NOTE — DISCHARGE INSTRUCTIONS
"Aftercare Plan  If I am feeling unsafe or I am in a crisis, I will:   Contact my established care providers   Call the National Suicide Prevention Lifeline: 233.500.8873   Go to the nearest emergency room   Call 911     Warning signs that I or other people might notice when a crisis is developing for me: increase in drug use, cravings for drugs, alcohol use,  isolation, spending time with old friends who do not have my best interests in mind, and hallucinations     Things I am able to do on my own to cope or help me feel better:  Walking around the lake and park, eating a home cooked meal, journaling, exercise    Things that I am able to do with others to cope or help me better: spending quality time with my daughter, watching her grow and learn as she gets older, working at my job    Things I can use or do for distraction: talk with family, walk around the lake and park, prepare meals, journal, exercise, work     Changes I can make to support my mental health and wellness: Try to get good sleep and eat nutritious food.  Continue working on goal of sobriety.     People in my life that I can ask for help: grandma, close friends    Your [facility] has a mental health crisis team you can call 24/7: [facility] Crisis  [phone]    Additional resources and information:      Crisis Lines  Crisis Text Line  Text 071934  You will be connected with a trained live crisis counselor to provide support.    National Hope Line  1.800.SUICIDE [7804824]    Community Resources  Fast Tracker  Linking people to mental health and substance use disorder resources  fasttrackermn.org     [place] Mental Health Warm Line  Peer to peer support  Monday thru Saturday, 12 pm to 10 pm  623.363.7409 or 8.493.155.6876  Text \"Support\" to 41714    National Independence on Mental Illness (DEVORAH)  206.865.7578 or 1.888.EDVORAH.HELPS    Mental Health Apps  My3  https://Brightfish.org/    OddslifeeBox  " https://[employer].Triage/apps/virtual-hope-box/    Substance Abuse Resources    To access substance abuse treatment you must have an assessment complete or have an updated assessment within 30 days of starting any program.  Information for this to be completed and to secure funding if you have medical assistance or no insurance can be found through your [facility]'s chemical health intake line.    If you have private insurance, call the customer service number on the back of your insurance card to find an in-network provider for substance abuse assessment. The ideal provider will be a treatment facility, licensed in the [facility].    For those with Medicaid with the [facility], you will need a Rule 25 assessment: The following are phone numbers for each [facility]. Rule 25 assessments must be completed by the county you reside in currently. Once approved for funding you can connect with a facility that does Rule 25 assessment.  [place] - [phone]  [facility] [phone]  [facility] [phone]  [facility] [phone]  [facility] [phone]  [facility] [phone]  [place] - [phone]  [facility] [phone]    The following facilities offer Rule 25/chemical health assessments:    [facility]  [phone]  Mon-Friday: [address] [facility], [address]  Saturday:  [address] [facility] [address]  M-F assessments (7a-1:45p); Saturday assessments (7:45-10:45a)    [facility]  [phone]  [address] [place], [place], [address]  *by appointment only M-W; walk ins available Fridays from 10-2.    [facility]  [phone] (phone consultation available 24/7)  In-person Assessments:  [address], [address], [place] [address]  [address] [address] [place] [address]  [address] [place] [address]  [address] [place] [address]     [facility]  [phone]  [address] [facility], [address]  [place], [place], [address]  *walk in and appointments available by  [facility]  [phone]  [address] [place], [place], [address]  *by appointment only M-Th    [facility] Adult Mental Health  [phone]  [address] [place], [place], [address]  *walk ins available M-F    [facility]  [phone]  [address] [place], [place], [address]  *available by appointments only      [facility]  [phone]  [address] [place], [place], [address]  *available by appointment only      [facility]  [phone]  [facility] - Outpatient Mental Health [facility] Addiction  [address] [place], [place], [address]    [facility]patient Alcohol [facility] Drug Abuse [facility] (ADAP)  [phone]  [address] [place] [address], [address]  *Walk in assessments also available M-F starting at 8 am.    [facility]  [phone]  [address] [place], [place], [address]    *available by appointments      [facility]  [phone]  [address] [place], [place], [address]  *walk in assessments available M-F starting at 7 am.    [facility]  [phone]  [facility]  [address] [place], [address]  *Walk in assessments availble M-F starting at 8 am OR [phone]    [facility]  [address] [place] [address]  *Walk in assessments available M-F starting at 8 am    [facility]  [phone]  [address] [place] [address]    [facility]  [phone]  [address] [place], [place], [address]    *available by appointment only    [facility]  [phone]  [address] [facility] [place], [address]    [facility]  [phone]  [address] [place], [place], [address]  *available by appointment    [facility]  [phone]  [address] [place], [address]  *walk ins available M-TH from 9-3    [facility]  [phone]  [address] [place], [address]    [facility]  [place]  [phone]  [address] [place] [address]  [address] [address], [place] [address]    [facility] (Does Rule 25 Assessments)  [email or web address]  [phone]  [address], [address], [place] [address]    [facility]  https://www.Marathon [employer]/our-services/drug-alcohol-treatment  [phone], [address], [place] [address]  [phone], [address], [place], [place] [address] [phone], [address] [facility], [address], [place], [place] [address] [phone], [address], [address], ([facility]), [place], [place] [id number] [phone], [address] [address] [place] [address]      If you are intoxicated, you may be required to detox at a detox facility before starting treatment. The following are detox facilities that you can self present to. All detox facilities are able to help you complete an assessment/rule 25 prior to discharge if you choose:      [facility]: [address] [place], [address].         [phone]    [facility]: [address] [place], [place], [address]  [phone]    [facility]: [address] [place], [place], [address] [phone]    [facility]: [address] [place], [place], [address] [phone]          Recommendations:  It is recommended that you abstain from all mood altering chemicals. Please contact the sober support hotline (588-874-7024) as needed; phones are answered 24 hours a day, 7 days a week. Other support hotlines include:    [facility] Mental Health & Addiction: 1-618.948.9024       Ways to help cope with sobriety:    -- Take prescribed medicines as scheduled  -- Keep follow-up appointments  -- Talk to others about your concerns  -- Get regular exercise    -- Practice deep breathing skills  -- Eat a healthy diet  -- Use community resources, including hotline numbers, [facility]  crisis and support meetings  -- Stay sober and avoid places/people/things associated with substance use    --Maintain a daily schedule/routine    --Get at least 7-8 hours of sleep per night    --Create a list 10--20 healthy activities that you can do that are enjoyable and do not involve substance use    --Create daily goals (approx. 1-4 goals) per day and work to achieve them throughout the day.     Free Resources:    [facility] ([facility])  [facility] connects people seeking recovery to resources that help foster and sustain long-term recovery. Whether you are seeking resources for treatment, transportation, housing, job training, education, health care or other pathways to recovery, [facility] is a great place to start.    Phone: [phone]. www.[employer] Hive.[employer]ssDsign [employer] (Great listing of all types of recovery and non-recovery related resources)      Alcoholics Anonymous    Phone: 9-235-ALCOHOL    Website: HTTP://WWW.AA.ORG/      [facility] [place] (892-336-2628 or http://aaRenal Solutions.org)    [facility] [place] (085-099-3508 or www.aasauPWA.org)       Narcotics Anonymous    Phone: 467.690.6534    Website: www.Promoco.PolicyGenius.      [employer] [address], [place], [place],    Phone: [phone]    Drop-in Hours: Monday-Friday 9-11:30 am. By appointment at other times.    Provides: [facility] is a drop-in center on the east side [facility] that provides a safe space for individuals who are homeless and have a history of chemical use. Sobriety is not a requirement but drugs and alcohol are not allowed on the property.    Services: Non-clients can access drop-in services such as Recovery and Harm Reduction Groups, referrals to case management, community activities, shower facilities, and a pool table. Individuals who are homeless and have chemical health needs may be eligible for enrollment into [facility]'s case management program. Clients and  work together to access  benefits, treatment, health care, shelter, and external housing resources.      [facility] Chemical Assessment & Referral [facility]    [address] [place], [place]    Phone: [phone]    Hours: Monday-Friday 8 am-5 pm.    Provides: Rule 25 assessment and referral for individuals seeking treatment or counseling for chemical dependency. Must be a resident of [facility]. There is no fee for assessment. There is some funding available for treatment programs.          [facility] SCHEDULING:  Today you were seen by a licensed mental health professional through [facility] sevices, [facility] ([facility])  for a crisis assessment in the Emergency Department at [facility].   Coordinators from [facility] will be calling you in the next 24-48 hours to ensure that you have the resources you need.  You can also contact [facility] coordinators directly at [phone].      [facility] maintains an extensive network of licensed behavioral health providers to connect patients with the services they need.  We do not charge providers a fee to participate in our referral network.  We match patients with providers based on a patient s specific needs, insurance coverage, and location.  Our first effort will be to refer you to a provider within your care system, and will utilize providers outside your care system as needed.

## 2022-05-24 NOTE — ED NOTES
Pt supposed to be dressing in room, after several minutes I entered room to find pt in corner of room naked touching himself. I left room, pt then put on pants and asked nurse to come back. Discharge instructions given, pt does not have a ride - taxi called for pt.

## 2022-05-24 NOTE — CONSULTS
"[facility] Crisis Reassessment      Naveen Baez was reassessed at the request of Dr. Castelan for the following reasons: Pt states that he wants to go home and that he used synthetic drugs (Utox was positive for amphetamines).  Pt states that these drugs have now worn off and he denies hallucinations, SI, or HI.  Pt is reporting that he was concerned that he would start using drugs again and accidentally overdose.       Pt was first seen on 5/22/22 by Nohelia Marques LP; see the initial assessment note for details.      The reassessment occurred from 9:00pm-9:35pm by video conference. Pt reports that he used synthetic cannabis mixed with amphetamine and fentanyl with friends.   He stated that he is interested in CD treatment and he has a list of resources at home.  Pt stated that he is concerned that if he does not stop using drugs, he will eventually overdose accidentally. Pt reports that he can think clearly now that the drugs are out of his system.  Pt resides in [facility].  Pt stated that he used drugs too much and admits that he hears voices in his head to get more drugs, but not to specifically die.  Pt said the only way he could possibly hurt himself would be by an accidental overdose.     Pt had future oriented thinking and talked about his job, family, and housing goals. 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  He has a 6 year old daughter and he states that he wants to provide for her financially.   Pt also shared that he has a goal of getting his own place as he is currently staying with family members.   He stated that he wants to save some money and live in a studio.  He also reports that he wants to education himself and get a different job, such as a desk job.  He also said he wants to go to AA/NA meetings to learn from others' experiences.  He sounded hopeful and stated \"maybe I will hear something in those groups, " "to spark me, to uplift me.\"   He reports he is working with his new job to get insurance and he reports that he is insured but it will take a few days to show up in the system.  Due to no insurance listed currently, we were unable to schedule outpatient services today however he seemed interested in having these listed on his safety plan.  He was calm, polite, and engaging.  He had a full range of affect which was appropriate for the situation.  His speech was clear.  He developed a safety plan and agrees to follow it. He said he feels safe going home.        Patient Presentation    Initial ED presentation details: Pt was presenting with psychosis, which was likely drug induced. He had SI with a plan and intent, and command hallucinations.      Current patient presentation: Pt denies hallucinations and did not appear to be responding to hallucinations.  He was calm and cooperative. He discussed future goals and other protective factors.      Changes observed since initial assessment: Pt's denies current hallucinations.  Thought process seemed clear.  He denies SI and HI.        Risk of Harm    Is the patient experiencing current suicidal ideation: No    Does the patient have thoughts of harming others? No      Mental Status Exam   Affect: Appropriate   Appearance: Appropriate    Attention Span/Concentration: Attentive?    Eye Contact: Engaged   Fund of Knowledge: Appropriate    Language /Speech Content: Fluent   Language /Speech Volume: Normal    Language /Speech Rate/Productions: Articulate and Normal    Recent Memory: Intact   Remote Memory: Intact   Mood: Normal    Orientation to Person: Yes    Orientation to Place: Yes   Orientation to Time of Day: Yes  \"around 7 in the evening\" - pt states that he has no access to a clock or TV   Orientation to Date: Answer: May 21st     Situation (Do they understand why they are here?): Yes    Psychomotor Behavior: Normal    Thought Content: Clear   Thought Form: Intact "       Additional Collateral Information   Collateral was obtained from Dr. Castelan.       Therapeutic Intervention  The following therapeutic methodologies were employed when working with the patient: Establishing rapport, Active listening, Assess dimensions of crisis, Identify additional supports and alternative coping skills, Establish a discharge plan, Motivational Interviewing and Safety planning. Patient response to intervention: calm and cooperative. Pt actively participated.      Disposition  Recommended disposition: Rule 25/KEN Assessment  - pt indicated that he will follow up with CD resources at home, when he has access to his insurance information.  Pt was also provided information about the walk in counseling center. He said he will consider outpatient therapy, however is less interested in this resource.  He is not interested in medication management at this time.      Reviewed case and recommendations with attending provider. Attending Name: Dr. Castelan      Attending concurs with disposition: Yes      Patient concurs with disposition: Yes      Final disposition: Other-per pt's preference, he will follow up with a CD resource provided on his safety plan. He was not able to schedule an appointment today due to not having his insurance information with him.  He recently got new insurance.        Clinical Substantiation of Recommendations  Pt likely was experiencing drug induced psychosis. He denies suicidal ideation, although reports that he is concerned that he may accidentally die from a drug overdose.  Pt reported numerous protective factors and developed a safety plan which he agrees to follow.        Assessment Details  Total duration spent on the patient case in minutes: 1.0 hrs     CPT code(s) utilized: 55172 - Psychotherapy for Crisis - 60 (30-74*) min       VICENTE Luna       Aftercare Plan  If I am feeling unsafe or I am in a crisis, I will:   Contact my established care providers   Call  "the National Suicide Prevention Lifeline: 119.478.8227   Go to the nearest emergency room   Call 911     Warning signs that I or other people might notice when a crisis is developing for me: increase in drug use, cravings for drugs, alcohol use,  isolation, spending time with old friends who do not have my best interests in mind, and hallucinations     Things I am able to do on my own to cope or help me feel better:  Walking around the [place] and park, eating a home cooked meal, journaling, exercise    Things that I am able to do with others to cope or help me better: spending quality time with my daughter, watching her grow and learn as she gets older, working at my job    Things I can use or do for distraction: talk with family, walk around the lake and park, prepare meals, journal, exercise, work     Changes I can make to support my mental health and wellness: Try to get good sleep and eat nutritious food.  Continue working on goal of sobriety.     People in my life that I can ask for help: grandma, close friends    Your [facility] has a mental health crisis team you can call 24/7: [facility] Crisis  [phone]    Additional resources and information:      Crisis Lines  Crisis Text Line  Text 213050  You will be connected with a trained live crisis counselor to provide support.    National Hope Line  1.800.SUICIDE [5542591]    Community Resources  Fast Tracker  Linking people to mental health and substance use disorder resources  fasttrackermn.org     [place] Mental Health Warm Line  Peer to peer support  Monday thru Saturday, 12 pm to 10 pm  [phone] or 3.421.472.0432  Text \"Support\" to 17285    National [place] on Mental Illness (DEVORAH)  876.552.5401 or 1.888.DEVORAH.HELPS    Mental Health Apps  My3  https://my3app.org/    VirtualHopeBox  https://[employer].org/apps/virtual-hope-box/    Substance Abuse Resources    To access substance abuse treatment you must have an assessment complete or have " an updated assessment within 30 days of starting any program.  Information for this to be completed and to secure funding if you have medical assistance or no insurance can be found through your [facility]'s chemical health intake line.    If you have private insurance, call the customer service number on the back of your insurance card to find an in-network provider for substance abuse assessment. The ideal provider will be a treatment facility, licensed in the [facility].    For those with Medicaid with the [facility], you will need a Rule 25 assessment: The following are phone numbers for each [facility]. Rule 25 assessments must be completed by the county you reside in currently. Once approved for funding you can connect with a facility that does Rule 25 assessment.  [facility] [phone]  [facility] [phone]  [facility] [phone]  [facility] [phone]  [facility] [phone]  [facility] [phone]  [place] - [phone]  [facility]) [phone]    The following facilities offer Rule 25/chemical health assessments:    [facility]  [phone]  Mon-Friday: [address] [facility], [address]  Saturday:  [address], [place], [address]  M-F assessments (7a-1:45p); Saturday assessments (7:45-10:45a)    [facility]  [phone]  [address] [place], [place], [address]  *by appointment only M-W; walk ins available Fridays from 10-2.    [facility]  [phone] (phone consultation available 24/7)  In-person Assessments:  [address], [place] [address]  [address] [place] [id number] [address] [place] [address]  [address] [place] [address]     [facility]  [phone]  [address] [facility] [address]  [place], [place], [address]  *walk in and appointments available by calling    [facility]  [phone] [address] [place], [place], [address]  *by appointment only M-Th    [facility]  [phone]  [address] [place], [place], [address]  *walk ins available M-F    [facility]  [phone]  [address] [place], [place], [address]  *available by appointments only      [facility]  [phone]  [address] [place], [place], [address]  *available by appointment only      [facility]  [phone]  [facility]  [address] [place], [place], [address]    [facility] ([facility])  [phone]  [address], [address]  *Walk in assessments also available M-F starting at 8 am.    [facility]  [phone]  [address] [place], [place], [address]    *available by appointments      [facility]  [phone]  [address] [place], [place], [address]  *walk in assessments available M-F starting at 7 am.    [facility]  [phone]  [facility]  [address] [place], [place], [address]  *Walk in assessments availble M-F starting at 8 am OR [phone]    [facility]  [address] [place] [address]  *Walk in assessments available M-F starting at 8 am    [facility]  [phone]  [address] [place] [address]    [facility]  [phone]  [address] [place], [place], [address]    *available by appointment only    [facility]  [phone]  [address]  [place], [address]    [facility]  [phone]  [address], [place], [address]  *available by appointment    [facility]  [phone]  [address] [place], [place], [address]  *walk ins available M-TH from 9-3    [facility]  [phone]  [address] [place], [place], [address]    [facility]  [phone] [address] [place] [address]  [address] [address] [address]    [facility] ([facility] Rule 25  Assessments)  [email or web address]  [phone]  [address], [address], [place] [address]    [facility]  [email or web address]-treatment  [phone], [address], [place], [place] [id number] [phone], [address], [place], [place] [address] [phone], [address] [facility], [address], [place] [address]  [phone], [address], [address], ([facility]), [place], [place] [id number] [phone], [address] [address] [place] [address]      If you are intoxicated, you may be required to detox at a detox facility before starting treatment. The following are detox facilities that you can self present to. All detox facilities are able to help you complete an assessment/rule 25 prior to discharge if you choose:      [facility]: [address] [place], [place], [address].         [phone]    [facility]: [address] [place], [address]  [phone]    [facility]: [address] [place], [place], [address] [phone]    [facility]: [address] [place], [place], [address] [phone]          Recommendations:  It is recommended that you abstain from all mood altering chemicals. Please contact the sober support hotline (440-482-0417) as needed; phones are answered 24 hours a day, 7 days a week. Other support hotlines include:    [facility] Mental Health & Addiction: 7-171-418-0958       Ways to help cope with sobriety:    -- Take prescribed medicines as scheduled  -- Keep follow-up appointments  -- Talk to others about your concerns  -- Get regular exercise    -- Practice deep breathing skills  -- Eat a healthy diet  -- Use community resources, including hotline numbers, [facility] crisis and support meetings  -- Stay sober and avoid places/people/things associated with substance use    --Maintain a daily schedule/routine    --Get  at least 7-8 hours of sleep per night    --Create a list 10--20 healthy activities that you can do that are enjoyable and do not involve substance use    --Create daily goals (approx. 1-4 goals) per day and work to achieve them throughout the day.     Free Resources:    [facility] ([facility])  [facility] connects people seeking recovery to resources that help foster and sustain long-term recovery. Whether you are seeking resources for treatment, transportation, housing, job training, education, health care or other pathways to recovery, [facility] is a great place to start.    Phone: [phone]. www.[facility] (Great listing of all types of recovery and non-recovery related resources)      Alcoholics Anonymous    Phone: 8-406-ALCOHOL    Website: HTTP://WWW.AA.ORG/      [facility] [place] (287-677-9027 or http://aaDigitalsmiths.org)    AA [facility] (320-308-3686 or www.aastpauLastRoom.org)       Narcotics Anonymous    Phone: 391.722.1568    Website: www."[employer].      [employer] [address], [place], [place],    Phone: [phone]    Drop-in Hours: Monday-Friday 9-11:30 am. By appointment at other times.    Provides: [facility] is a drop-in center on the east side [facility] that provides a safe space for individuals who are homeless and have a history of chemical use. Sobriety is not a requirement but drugs and alcohol are not allowed on the property.    Services: Non-clients can access drop-in services such as Recovery and Harm Reduction Groups, referrals to case management, community activities, shower facilities, and a pool table. Individuals who are homeless and have chemical health needs may be eligible for enrollment into [facility]'s case management program. Clients and  work together to access benefits, treatment, health care, shelter, and external housing resources.      [facility] Chemical Assessment & Referral Unit    [address] [place] [address]  [place], [place]    Phone: [phone]    Hours: Monday-Friday 8 am-5 pm.    Provides: Rule 25 assessment and referral for individuals seeking treatment or counseling for chemical dependency. Must be a resident of [facility]. There is no fee for assessment. There is some funding available for treatment programs.          [facility] SCHEDULING:  Today you were seen by a licensed mental health professional through [facility] ([facility])  for a crisis assessment in the Emergency Department at [facility].   Coordinators from [facility] will be calling you in the next 24-48 hours to ensure that you have the resources you need.  You can also contact [facility] coordinators directly at [phone].      [facility] maintains an extensive network of licensed behavioral health providers to connect patients with the services they need.  We do not charge providers a fee to participate in our referral network.  We match patients with providers based on a patient s specific needs, insurance coverage, and location.  Our first effort will be to refer you to a provider within your care system, and will utilize providers outside your care system as needed.